# Patient Record
Sex: FEMALE | URBAN - METROPOLITAN AREA
[De-identification: names, ages, dates, MRNs, and addresses within clinical notes are randomized per-mention and may not be internally consistent; named-entity substitution may affect disease eponyms.]

---

## 2023-02-21 ENCOUNTER — APPOINTMENT (RX ONLY)
Dept: URBAN - METROPOLITAN AREA CLINIC 15 | Facility: CLINIC | Age: 38
Setting detail: DERMATOLOGY
End: 2023-02-21

## 2023-02-21 DIAGNOSIS — Z41.9 ENCOUNTER FOR PROCEDURE FOR PURPOSES OTHER THAN REMEDYING HEALTH STATE, UNSPECIFIED: ICD-10-CM

## 2023-02-21 PROCEDURE — ? COSMETIC CONSULTATION: INMODE MORPHEUS 8

## 2023-02-21 PROCEDURE — ? COSMETIC CONSULTATION: BOTOX

## 2023-02-21 PROCEDURE — ? BOTOX

## 2023-02-21 PROCEDURE — ? COSMETIC CONSULTATION: FILLERS

## 2023-02-21 PROCEDURE — ? FILLERS

## 2023-02-21 NOTE — PROCEDURE: FILLERS
Lateral Face Filler  Volume In Cc: 0
Additional Area 1 Location: perioral lines
Number Of Syringes (Required For Inventory): 1
Additional Area 2 Location: oral commissures, marionettes, chin shadowing
Include Cannula Information In Note?: No
Filler: RHA 3
Additional Area 3 Location: mid face, cheeks
Include Cannula Length?: 2 inch
Additional Anesthesia Volume In Cc: 6
Inventory Information: This plan will send filler information to inventory based on the fillers you select. Multiple fillers can be sent but you must ensure you select the appropriate fillers in the inventory tab.
Map Statment: See [address] for Complete Details
Detail Level: Zone
Additional Area 1 Location: cutaneous lip
Additional Area 1 Location: chest wrinkles
Include Cannula Brand?: DermaSculpt
Additional Area 2 Location: periorbital lines, Glabella
Filler: Juvederm Voluma XC
Consent: Written consent obtained. Risks include but not limited to bruising, beading, irregular texture, ulceration, infection, allergic reaction, scar formation, incomplete augmentation, temporary nature, procedural pain.
Include Cannula Size?: 25G
Show Inventory Tab: Show
Additional Area 3 Location: cheeks acne scars
Post-Care Instructions: Patient instructed to apply ice to reduce swelling.
Additional Area 4 Location: buttocks

## 2023-02-21 NOTE — PROCEDURE: BOTOX
Periorbital Skin Units: 0
Additional Area 6 Location: anterior neck
Show Additional Area 6: Yes
Dilution (U/0.1 Cc): 1
Additional Area 1 Location: neck Mabel(4cc)
Forehead Units: [address] [id number]
Additional Area 5 Location: upper lip
Detail Level: Zone
Glabellar Complex Units: 15
Additional Area 4 Location: right axilla
Show Ucl Units: No
Additional Area 3 Location: right eye
Incrementing Botox Units: By 0.5 Units
Show Inventory Tab: Show
Consent: Written consent obtained. Risks include but not limited to lid/brow ptosis, bruising, swelling, diplopia, temporary effect, incomplete chemical denervation.
Lot #: [id number]
Additional Area 2 Location: chin
Expiration Date (Month Year): 12/24
Post-Care Instructions: Patient instructed to not lie down for 4 hours and limit physical activity for 24 hours. Patient instructed not to travel by airplane for 48 hours.
Periorbital Skin Units: [address]

## 2023-02-21 NOTE — HPI: COSMETIC CONSULTATION
Additional History: Patient takes multivitamins and fish oil. Patient wants to discuss treatments to improve smile lines.  Last time she has Botox was August

## 2023-03-07 ENCOUNTER — APPOINTMENT (RX ONLY)
Dept: URBAN - METROPOLITAN AREA CLINIC 15 | Facility: CLINIC | Age: 38
Setting detail: DERMATOLOGY
End: 2023-03-07

## 2023-03-07 DIAGNOSIS — Z41.9 ENCOUNTER FOR PROCEDURE FOR PURPOSES OTHER THAN REMEDYING HEALTH STATE, UNSPECIFIED: ICD-10-CM

## 2023-03-07 PROCEDURE — ? BOTOX

## 2023-03-07 NOTE — PROCEDURE: BOTOX
Glabellar Complex Units: 0
Additional Area 5 Location: upper lip
Show Additional Area 4: Yes
Detail Level: Zone
Additional Area 4 Location: right axilla
Incrementing Botox Units: By 0.5 Units
Show Mentalis Units: No
Consent: Written consent obtained. Risks include but not limited to lid/brow ptosis, bruising, swelling, diplopia, temporary effect, incomplete chemical denervation.
Additional Area 2 Location: chin
Post-Care Instructions: Patient instructed to not lie down for 4 hours and limit physical activity for 24 hours. Patient instructed not to travel by airplane for 48 hours.
Additional Area 3 Location: right eye
Show Inventory Tab: Show
Patient Specific Comments (Will Not Stick From Patient To Patient): BOTOX NC . 74/1
Expiration Date (Month Year): 12/24
Lot #: [id number]
Glabellar Complex Units: 5
Forehead Units: 3
Dilution (U/0.1 Cc): 1
Additional Area 6 Location: anterior neck